# Patient Record
Sex: MALE | URBAN - NONMETROPOLITAN AREA
[De-identification: names, ages, dates, MRNs, and addresses within clinical notes are randomized per-mention and may not be internally consistent; named-entity substitution may affect disease eponyms.]

---

## 2023-06-30 ENCOUNTER — OFFICE VISIT (OUTPATIENT)
Dept: FAMILY MEDICINE CLINIC | Age: 18
End: 2023-06-30

## 2023-06-30 VITALS
WEIGHT: 124 LBS | DIASTOLIC BLOOD PRESSURE: 68 MMHG | RESPIRATION RATE: 16 BRPM | BODY MASS INDEX: 19.93 KG/M2 | HEART RATE: 74 BPM | HEIGHT: 66 IN | SYSTOLIC BLOOD PRESSURE: 120 MMHG | TEMPERATURE: 97.8 F | OXYGEN SATURATION: 100 %

## 2023-06-30 DIAGNOSIS — F25.1 SCHIZOAFFECTIVE DISORDER, DEPRESSIVE TYPE (HCC): Primary | ICD-10-CM

## 2023-06-30 DIAGNOSIS — F90.0 ATTENTION DEFICIT HYPERACTIVITY DISORDER (ADHD), PREDOMINANTLY INATTENTIVE TYPE: ICD-10-CM

## 2023-06-30 DIAGNOSIS — F43.10 PTSD (POST-TRAUMATIC STRESS DISORDER): ICD-10-CM

## 2023-06-30 DIAGNOSIS — F40.01 PANIC DISORDER WITH AGORAPHOBIA: ICD-10-CM

## 2023-06-30 PROCEDURE — 99204 OFFICE O/P NEW MOD 45 MIN: CPT | Performed by: NURSE PRACTITIONER

## 2023-06-30 RX ORDER — CLONIDINE HYDROCHLORIDE 0.1 MG/1
0.1 TABLET ORAL 2 TIMES DAILY
COMMUNITY

## 2023-06-30 RX ORDER — TRAZODONE HYDROCHLORIDE 50 MG/1
50 TABLET ORAL NIGHTLY
COMMUNITY
Start: 2023-06-02

## 2023-06-30 RX ORDER — BUPROPION HYDROCHLORIDE 150 MG/1
150 TABLET ORAL DAILY
COMMUNITY
Start: 2023-05-29 | End: 2023-06-30

## 2023-06-30 RX ORDER — BUSPIRONE HYDROCHLORIDE 7.5 MG/1
7.5 TABLET ORAL 3 TIMES DAILY
Qty: 90 TABLET | Refills: 0 | Status: SHIPPED | OUTPATIENT
Start: 2023-06-30 | End: 2023-07-30

## 2023-06-30 RX ORDER — HYDROXYZINE HYDROCHLORIDE 25 MG/1
25 TABLET, FILM COATED ORAL 2 TIMES DAILY PRN
COMMUNITY
Start: 2023-05-29

## 2023-06-30 RX ORDER — BUPROPION HYDROCHLORIDE 300 MG/1
300 TABLET ORAL DAILY
Qty: 30 TABLET | Refills: 1 | Status: SHIPPED | OUTPATIENT
Start: 2023-06-30

## 2023-06-30 RX ORDER — ATOMOXETINE 60 MG/1
60 CAPSULE ORAL DAILY
COMMUNITY
Start: 2023-05-29

## 2023-06-30 RX ORDER — PRAZOSIN HYDROCHLORIDE 1 MG/1
1 CAPSULE ORAL NIGHTLY
COMMUNITY
Start: 2023-06-02

## 2023-06-30 SDOH — ECONOMIC STABILITY: INCOME INSECURITY: HOW HARD IS IT FOR YOU TO PAY FOR THE VERY BASICS LIKE FOOD, HOUSING, MEDICAL CARE, AND HEATING?: NOT HARD AT ALL

## 2023-06-30 SDOH — ECONOMIC STABILITY: FOOD INSECURITY: WITHIN THE PAST 12 MONTHS, YOU WORRIED THAT YOUR FOOD WOULD RUN OUT BEFORE YOU GOT MONEY TO BUY MORE.: NEVER TRUE

## 2023-06-30 SDOH — ECONOMIC STABILITY: HOUSING INSECURITY
IN THE LAST 12 MONTHS, WAS THERE A TIME WHEN YOU DID NOT HAVE A STEADY PLACE TO SLEEP OR SLEPT IN A SHELTER (INCLUDING NOW)?: NO

## 2023-06-30 SDOH — ECONOMIC STABILITY: FOOD INSECURITY: WITHIN THE PAST 12 MONTHS, THE FOOD YOU BOUGHT JUST DIDN'T LAST AND YOU DIDN'T HAVE MONEY TO GET MORE.: NEVER TRUE

## 2023-06-30 ASSESSMENT — PATIENT HEALTH QUESTIONNAIRE - PHQ9
SUM OF ALL RESPONSES TO PHQ QUESTIONS 1-9: 2
SUM OF ALL RESPONSES TO PHQ9 QUESTIONS 1 & 2: 2
SUM OF ALL RESPONSES TO PHQ QUESTIONS 1-9: 2
2. FEELING DOWN, DEPRESSED OR HOPELESS: 1
1. LITTLE INTEREST OR PLEASURE IN DOING THINGS: 1
SUM OF ALL RESPONSES TO PHQ QUESTIONS 1-9: 2
SUM OF ALL RESPONSES TO PHQ QUESTIONS 1-9: 2

## 2023-09-15 RX ORDER — TRAZODONE HYDROCHLORIDE 50 MG/1
50 TABLET ORAL NIGHTLY
Qty: 30 TABLET | Refills: 4 | Status: SHIPPED | OUTPATIENT
Start: 2023-09-15

## 2023-09-15 RX ORDER — CLONIDINE HYDROCHLORIDE 0.1 MG/1
0.2 TABLET ORAL NIGHTLY
Qty: 60 TABLET | Refills: 4 | Status: SHIPPED | OUTPATIENT
Start: 2023-09-15

## 2023-09-15 RX ORDER — PRAZOSIN HYDROCHLORIDE 1 MG/1
1 CAPSULE ORAL NIGHTLY
Qty: 30 CAPSULE | Refills: 4 | Status: SHIPPED | OUTPATIENT
Start: 2023-09-15

## 2023-09-20 ENCOUNTER — TELEPHONE (OUTPATIENT)
Dept: FAMILY MEDICINE CLINIC | Age: 18
End: 2023-09-20

## 2023-09-20 DIAGNOSIS — S39.012A STRAIN OF LUMBAR REGION, INITIAL ENCOUNTER: Primary | ICD-10-CM

## 2023-09-20 RX ORDER — TIZANIDINE 4 MG/1
4 TABLET ORAL EVERY 8 HOURS PRN
Qty: 30 TABLET | Refills: 0 | Status: SHIPPED | OUTPATIENT
Start: 2023-09-20

## 2023-09-20 RX ORDER — IBUPROFEN 800 MG/1
800 TABLET ORAL 4 TIMES DAILY PRN
Qty: 120 TABLET | Refills: 0 | Status: SHIPPED | OUTPATIENT
Start: 2023-09-20

## 2023-09-20 NOTE — TELEPHONE ENCOUNTER
Patient injured his back carrying a very heavy object. Has been taking motrin 800 mg which does help some. Would like something else sent to pharmacy. Rx for 800 mg Motrin and Zanaflex 4 mg sent to [facility].

## 2023-10-05 ENCOUNTER — HOSPITAL ENCOUNTER (INPATIENT)
Age: 18
LOS: 4 days | Discharge: HOME | DRG: 885 | End: 2023-10-09
Attending: PSYCHIATRY & NEUROLOGY | Admitting: PSYCHIATRY & NEUROLOGY
Payer: MEDICAID

## 2023-10-05 PROBLEM — F33.3 MDD (MAJOR DEPRESSIVE DISORDER), RECURRENT, SEVERE, WITH PSYCHOSIS (HCC): Status: ACTIVE | Noted: 2023-10-05

## 2023-10-05 PROBLEM — F41.1 GAD (GENERALIZED ANXIETY DISORDER): Chronic | Status: ACTIVE | Noted: 2023-06-30

## 2023-10-05 LAB
T4 FREE SERPL-MCNC: 1.62 NG/DL (ref 0.93–1.76)
TSH SERPL DL<=0.005 MIU/L-ACNC: 0.81 UIU/ML (ref 0.4–4.2)

## 2023-10-05 PROCEDURE — 1240000000 HC EMOTIONAL WELLNESS R&B

## 2023-10-05 PROCEDURE — 36415 COLL VENOUS BLD VENIPUNCTURE: CPT

## 2023-10-05 PROCEDURE — 6370000000 HC RX 637 (ALT 250 FOR IP): Performed by: PSYCHIATRY & NEUROLOGY

## 2023-10-05 PROCEDURE — 84439 ASSAY OF FREE THYROXINE: CPT

## 2023-10-05 PROCEDURE — 84443 ASSAY THYROID STIM HORMONE: CPT

## 2023-10-05 RX ORDER — FLUOXETINE HYDROCHLORIDE 20 MG/1
20 CAPSULE ORAL DAILY
Status: DISCONTINUED | OUTPATIENT
Start: 2023-10-06 | End: 2023-10-08

## 2023-10-05 RX ORDER — IBUPROFEN 400 MG/1
400 TABLET ORAL EVERY 6 HOURS PRN
Status: DISCONTINUED | OUTPATIENT
Start: 2023-10-05 | End: 2023-10-08

## 2023-10-05 RX ORDER — POLYETHYLENE GLYCOL 3350 17 G/17G
17 POWDER, FOR SOLUTION ORAL DAILY PRN
Status: DISCONTINUED | OUTPATIENT
Start: 2023-10-05 | End: 2023-10-09 | Stop reason: HOSPADM

## 2023-10-05 RX ORDER — TRAZODONE HYDROCHLORIDE 50 MG/1
50 TABLET ORAL NIGHTLY PRN
Status: DISCONTINUED | OUTPATIENT
Start: 2023-10-05 | End: 2023-10-09 | Stop reason: HOSPADM

## 2023-10-05 RX ORDER — RISPERIDONE 0.25 MG/1
0.5 TABLET ORAL NIGHTLY
Status: DISCONTINUED | OUTPATIENT
Start: 2023-10-05 | End: 2023-10-09 | Stop reason: HOSPADM

## 2023-10-05 RX ORDER — CLONIDINE HYDROCHLORIDE 0.2 MG/1
0.2 TABLET ORAL NIGHTLY
Status: DISCONTINUED | OUTPATIENT
Start: 2023-10-05 | End: 2023-10-09 | Stop reason: HOSPADM

## 2023-10-05 RX ORDER — ACETAMINOPHEN 325 MG/1
650 TABLET ORAL EVERY 4 HOURS PRN
Status: DISCONTINUED | OUTPATIENT
Start: 2023-10-05 | End: 2023-10-08

## 2023-10-05 RX ORDER — PRAZOSIN HYDROCHLORIDE 1 MG/1
1 CAPSULE ORAL NIGHTLY
Status: DISCONTINUED | OUTPATIENT
Start: 2023-10-05 | End: 2023-10-09 | Stop reason: HOSPADM

## 2023-10-05 RX ORDER — HYDROXYZINE HYDROCHLORIDE 25 MG/1
50 TABLET, FILM COATED ORAL 3 TIMES DAILY PRN
Status: DISCONTINUED | OUTPATIENT
Start: 2023-10-05 | End: 2023-10-09 | Stop reason: HOSPADM

## 2023-10-05 RX ORDER — POLYETHYLENE GLYCOL 3350 17 G
2 POWDER IN PACKET (EA) ORAL
Status: DISCONTINUED | OUTPATIENT
Start: 2023-10-05 | End: 2023-10-09 | Stop reason: HOSPADM

## 2023-10-05 RX ADMIN — CLONIDINE HYDROCHLORIDE 0.2 MG: 0.2 TABLET ORAL at 22:28

## 2023-10-05 RX ADMIN — TRAZODONE HYDROCHLORIDE 50 MG: 50 TABLET ORAL at 22:03

## 2023-10-05 RX ADMIN — RISPERIDONE 0.5 MG: 0.25 TABLET, FILM COATED ORAL at 22:28

## 2023-10-05 RX ADMIN — NICOTINE POLACRILEX 2 MG: 2 LOZENGE ORAL at 18:37

## 2023-10-05 RX ADMIN — IBUPROFEN 400 MG: 400 TABLET, FILM COATED ORAL at 20:56

## 2023-10-05 RX ADMIN — PRAZOSIN HYDROCHLORIDE 1 MG: 1 CAPSULE ORAL at 22:28

## 2023-10-05 RX ADMIN — NICOTINE POLACRILEX 2 MG: 2 LOZENGE ORAL at 16:29

## 2023-10-05 RX ADMIN — NICOTINE POLACRILEX 2 MG: 2 LOZENGE ORAL at 20:56

## 2023-10-05 RX ADMIN — HYDROXYZINE HYDROCHLORIDE 50 MG: 25 TABLET, FILM COATED ORAL at 16:29

## 2023-10-05 ASSESSMENT — SLEEP AND FATIGUE QUESTIONNAIRES
DO YOU HAVE DIFFICULTY SLEEPING: YES
AVERAGE NUMBER OF SLEEP HOURS: 8
SLEEP PATTERN: DIFFICULTY FALLING ASLEEP;EARLY AWAKENING;RESTLESSNESS;NIGHTMARES/TERRORS
DO YOU USE A SLEEP AID: YES

## 2023-10-05 ASSESSMENT — PATIENT HEALTH QUESTIONNAIRE - PHQ9
SUM OF ALL RESPONSES TO PHQ QUESTIONS 1-9: 2
2. FEELING DOWN, DEPRESSED OR HOPELESS: 1
SUM OF ALL RESPONSES TO PHQ9 QUESTIONS 1 & 2: 2
1. LITTLE INTEREST OR PLEASURE IN DOING THINGS: 1
SUM OF ALL RESPONSES TO PHQ QUESTIONS 1-9: 2

## 2023-10-05 ASSESSMENT — PAIN DESCRIPTION - LOCATION: LOCATION: BACK

## 2023-10-05 ASSESSMENT — PAIN DESCRIPTION - ORIENTATION: ORIENTATION: MID;UPPER

## 2023-10-05 ASSESSMENT — PAIN - FUNCTIONAL ASSESSMENT: PAIN_FUNCTIONAL_ASSESSMENT: ACTIVITIES ARE NOT PREVENTED

## 2023-10-05 ASSESSMENT — PAIN SCALES - GENERAL: PAINLEVEL_OUTOF10: 3

## 2023-10-05 ASSESSMENT — LIFESTYLE VARIABLES
HOW OFTEN DO YOU HAVE A DRINK CONTAINING ALCOHOL: NEVER
HOW MANY STANDARD DRINKS CONTAINING ALCOHOL DO YOU HAVE ON A TYPICAL DAY: PATIENT DOES NOT DRINK

## 2023-10-05 ASSESSMENT — PAIN DESCRIPTION - DESCRIPTORS: DESCRIPTORS: ACHING

## 2023-10-05 NOTE — PROGRESS NOTES
Behavioral Health   Admission Note   Admission Type: Voluntary    Reason for Admission: Overdose    Patient Strengths/Barriers  Strengths (Must Choose Two): Independent living, Education, Support from family  Barriers: Independent living, Technical/vocation    Addictive Behavior  In the Past 3 Months, Have You Felt or Has Someone Told You That You Have a Problem With  : None    Medical Problems:   History reviewed. No pertinent past medical history. Status EXAM:  Mental Status and Behavioral Exam  Normal: No  Level of Assistance: Independent/Self  Facial Expression: Avoids Gaze, Flat  Affect: Blunt  Level of Consciousness: Alert  Frequency of Checks: 4 times per hour, close  Mood:Normal: No  Mood: Depressed, Anxious, Sad, Worthless, low self-esteem  Motor Activity:Normal: No  Motor Activity: Decreased  Eye Contact: Fair  Observed Behavior: Cooperative  Sexual Misconduct History: Current - no  Preception: [place] to person, [place] to place, [place] to time, [place] to situation  Attention:Normal: No  Attention: Distractible  Thought Processes: Circumstantial  Thought Content:Normal: Yes  Depression Symptoms: Feelings of hopelessess, Feelings of helplessness, Feelings of worthlessness, Impaired concentration, Loss of interest, Isolative, Change in energy level  Anxiety Symptoms: Generalized  Irma Symptoms: No problems reported or observed. Hallucinations: Auditory (comment), Visual (comment) (\"Degrading or non-sensical voices\"  sees creatures.)  Delusions: Yes  Delusions: Paranoid  Memory:Normal: No  Memory: Poor recent  Insight and Judgment: No  Insight and Judgment: Poor judgment, Poor insight, Unmotivated    Pt admitted with followings belongings:  Dental Appliances: None  Vision - Corrective Lenses: Contact Lenses, Eyeglasses (Patient reports he has contacts in.)  Hearing Aid: None  Jewelry: None  Body Piercings Removed: No  Clothing: Socks, Undergarments, Shirt, Pants  Other Valuables:  Other (Comment) (Locked

## 2023-10-05 NOTE — BH NOTE
INPATIENT RECREATIONAL THERAPY  ADULT BEHAVIORAL SERVICES  ASSESSMENT    REFERRING PHYSICIAN: Rudy Jones  DIAGNOSIS:   MDD  PRECAUTIONS:  Full Code  HISTORY OF PRESENT ILLNESS/INJURY:   PMH:  Please see medical chart for prior medical history, allergies, and medication. HISTORY OF PSYCHIATRIC TREATMENT: PTSD, Panic Disorder, ADHD, Anxiety and Depression. 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 IDENTIFIED SUPPORT SYSTEM:  Dad, Mom, Sister, Brother. Friend in [place] who talks with Otto Miller everyday. EDUCATIONAL LEVEL: G.E.D.  MEDICATION/DRUG USE: \"I use to do a lot of stuff in highschool, I smoke weed in [place], I will drink whatever alcohol I can, I also will do pills\"      COGNITION: Full  ATTENTION: Throughout school pt had difficult time paying attention.   RELAXATION: Able to Relax  SELF-ESTEEM:  \"It's botched, it's never been good\"  MOTIVATION: Low    SOCIAL SKILLS:  Proficient   FRUSTRATION TOLERANCE:  High  ATTENTION SEEKING: N/A  COOPERATION: Full  AFFECT: Restricted  APPEARANCE: Healthy    HEARING:  WNL  VISION:  WNL   VERBAL COMMUNICATION:  Normal  WRITTEN COMMUNICATION:  Normal    COORDINATION: Normal   MOBILITY: Normal    GOALS:  \"I want to feel a little bit more ready to try motivation and ambition\"

## 2023-10-05 NOTE — PROGRESS NOTES
OQ Admission    Most Recent Score:    82    Baseline Score:   82    Change From Initial: No Reliable Change  Distress Level: Severe       Graph Type: Total  Most Recent Critical Item Status:  7. Suicide - I have thoughts of ending my life. Frequently  11. Substance Abuse - I use alcohol or a drug to get going in the morning. Sometimes  20. Substance Abuse - People criticize my drinking (or drug use). Sometimes  24. Substance Abuse - I have trouble at work/school or other daily activities because of drinking or drug use.  Rarely

## 2023-10-06 PROCEDURE — 6370000000 HC RX 637 (ALT 250 FOR IP): Performed by: PSYCHIATRY & NEUROLOGY

## 2023-10-06 PROCEDURE — 1240000000 HC EMOTIONAL WELLNESS R&B

## 2023-10-06 RX ADMIN — CLONIDINE HYDROCHLORIDE 0.2 MG: 0.2 TABLET ORAL at 22:02

## 2023-10-06 RX ADMIN — HYDROXYZINE HYDROCHLORIDE 50 MG: 25 TABLET, FILM COATED ORAL at 16:34

## 2023-10-06 RX ADMIN — NICOTINE POLACRILEX 2 MG: 2 LOZENGE ORAL at 10:10

## 2023-10-06 RX ADMIN — PRAZOSIN HYDROCHLORIDE 1 MG: 1 CAPSULE ORAL at 22:02

## 2023-10-06 RX ADMIN — NICOTINE POLACRILEX 2 MG: 2 LOZENGE ORAL at 16:34

## 2023-10-06 RX ADMIN — NICOTINE POLACRILEX 2 MG: 2 LOZENGE ORAL at 22:02

## 2023-10-06 RX ADMIN — RISPERIDONE 0.5 MG: 0.25 TABLET, FILM COATED ORAL at 22:02

## 2023-10-06 RX ADMIN — FLUOXETINE 20 MG: 20 CAPSULE ORAL at 09:23

## 2023-10-06 RX ADMIN — NICOTINE POLACRILEX 2 MG: 2 LOZENGE ORAL at 13:41

## 2023-10-06 ASSESSMENT — PAIN SCALES - GENERAL
PAINLEVEL_OUTOF10: 0
PAINLEVEL_OUTOF10: 0

## 2023-10-06 NOTE — H&P
psychotic  features. 2.  Generalized anxiety disorder. 3.  Post-traumatic stress disorder. 4.  Rule out schizoaffective disorder, depressed type. 5.  Recent overdose. 6.  Chronic mental illness, recent breakup from girlfriend. RECOMMENDATIONS:  1. Admit to the unit. 2.  Routine lab ordered. 3.  Resume home medication, but change trazodone to as needed. Start  fluoxetine, risperidone and hydroxyzine. 4.  Risks and benefits of psychotropics discussed as well as alternative  treatment. 5.  Support and reassurance given. 6.  Milieu and group therapy to develop insight to psychiatric illness  and better coping mechanism. 7.  Upon discharge, he will be referred for outpatient management. PATIENT'S STRENGTH:  His mother.         Isaias Zayas M.D.    D: 10/05/2023 21:31:22       T: 10/05/2023 21:37:05     [id number]  Job#: [id number]     Doc#: [id number]    CC:

## 2023-10-06 NOTE — PATIENT CARE CONFERENCE
[facility]  Initial Interdisciplinary Treatment Plan NOTE    REVIEW DATE AND TIME: 10/6/23 0847    PATIENT was IN TREATMENT TEAM.  See Multidisciplinary Treatment Team sheet for participants. ADMISSION TYPE:   Admission Type: Voluntary    REASON FOR ADMISSION:  Reason for Admission: Overdose      Estimated Length of Stay Update:  3-5 days  Estimated Discharge Date Update: 10/7/23    Patient Strengths/Barriers  Strengths (Must Choose Two): Independent living, Education, Support from family  Barriers: Independent living, Technical/vocation  Addictive Behavior:Addictive Behavior  In the Past 3 Months, Have You Felt or Has Someone Told You That You Have a Problem With  : None  Medical Problems:History reviewed. No pertinent past medical history. EDUCATION:   Learner Progress Toward Treatment Goals: Reviewed results and recommendations of this team, Reviewed group plan and strategies, Reviewed signs, symptoms and risk of self harm and violent behavior, and Reviewed goals and plan of care    Method: Small group    Outcome: Refused Education    PATIENT GOALS: Stabilize pt psychiatric symptoms     OQ TOP QUALITY PRIORITIES FOR THE PATIENT AS IDENTIFIED ON ADMISSION ADMINISTRATION:        80 - Suicidal Ideation/Substance Abuse  PLAN/TREATMENT RECOMMENDATIONS UPDATE:   What is the most important thing we can help you with while you are here? See above  Who is your support system? ADY  Do you have follow-up providers? ADY  Do you have the ability to pay for your medications? Per Epic, pt does not currently have insurance  Where will you be residing when you leave the hospital? Per [facility], with mother and sister. Will need a return to work slip or FMLA paper completion?  ADY      GOALS UPDATE:   Time frame for Short-Term Goals: Daily    JOSE ARMANDO Hicks

## 2023-10-06 NOTE — PROGRESS NOTES
Group Therapy Note    Date: 10/6/2023  Start Time: 1330  End Time:  1440  Number of Participants: 6    Type of Group: Psychotherapy      Notes:  Pt is present for group. The group continued with the discussion from [facility] group. The group continued to examine personal beliefs, the origin of those beliefs and the way in which they contribute to emotional distress. The group identified and discussed  faulty beliefs and how they result in self sabotaging behaviors. The group was introduced to CBT concepts and how they may challenge and modify those thoughts. The group also participated in a simple stress management activity. Status After Intervention:  Improved    Participation Level:  Active Listener and Interactive    Participation Quality: Appropriate, Attentive, Sharing, and Supportive      Speech:  normal      Thought Process/Content: Logical  Linear      Affective Functioning: Congruent      Mood: euthymic      Level of consciousness:  Alert, Oriented x4, and Attentive      Response to Learning: Able to verbalize current knowledge/experience, Able to verbalize/acknowledge new learning, Able to retain information, Capable of insight, Able to change behavior, and Progressing to goal      Endings: None Reported    Modes of Intervention: Education, Support, Socialization, Exploration, Clarifying, Problem-solving, and Activity      Discipline Responsible: /Counselor      Signature:  JOSE ARMANDO Antoine

## 2023-10-06 NOTE — BH NOTE
RECREATIONAL THERAPY GROUP NOTE    Start Time:  1000  End Time:  1030    Group Type:  RT group    Group Topic: Hands On         Intervention Method:  Social Game    Attendance:  Attended    Affect/Mood: Bright    Thought Process:  Clear    Behavior:  Pt was lethargic from low BP    Interactions/Socialization:  Interacted readily    Response to Intervention:  Positve

## 2023-10-06 NOTE — PROGRESS NOTES
Psychosocial Assessment    Current Level of Psychosocial Functioning     Independent         Dependent    Minimal Assist     Comments:  ADY as pt was sleeping at time of assessment and would not wake    Psychosocial High Risk Factors (check all that apply)    Unable to obtain meds   Chronic illness/pain    Substance abuse   Lack of Family Support   Financial stress   Isolation   Inadequate Community Resources  Suicide attempt(s)  Not taking medications   Victim of crime   Developmental Delay  Unable to manage personal needs    Age 72 or older   Homeless  No transportation   Readmission within 30 days  Unemployment  Traumatic Event  ADY    Family/Supports identified: ADY    Sexual Orientation:  ADY    Patient Strengths: ADY    Patient Barriers: ADY    Safety plan: Contracts for safety    CMHC/ history: ADY    Plan of Care:  medication management, group/individual therapies, family meetings, psycho -education, treatment team meetings to assist with stabilization    Initial Discharge Plan:  ADY    Clinical Summary:  Patient is a 19year old male that was admitted to the unit as a transfer from [facility]. Per H&P \"He was taken to [facility] after taking about 15 tablets of tizanidine in an attempt to kill himself. He has a long history of depression, anxiety and hallucinations. His depressive symptoms have been getting worse past 4 to 5 months. \". Per RN Admission Note \"Patient reports being \"over it. \" Patient states he moved here in June against his will. Patient  reports he broke up with his girlfriend prior to coming to [place] and has not gotten over it. Patient reports having \"survived a cult. \" Patient states name of the cult is [employer]. \" Patient states that the leader of the cult believes himself to be a prophet. Patient states his mom still follows the beliefs of the cult. \". ADY farther due to pt sleeping and unable to wake.

## 2023-10-07 PROCEDURE — 6370000000 HC RX 637 (ALT 250 FOR IP): Performed by: PSYCHIATRY & NEUROLOGY

## 2023-10-07 PROCEDURE — 1240000000 HC EMOTIONAL WELLNESS R&B

## 2023-10-07 RX ADMIN — RISPERIDONE 0.5 MG: 0.25 TABLET, FILM COATED ORAL at 22:05

## 2023-10-07 RX ADMIN — HYDROXYZINE HYDROCHLORIDE 50 MG: 25 TABLET, FILM COATED ORAL at 20:34

## 2023-10-07 RX ADMIN — PRAZOSIN HYDROCHLORIDE 1 MG: 1 CAPSULE ORAL at 22:05

## 2023-10-07 RX ADMIN — NICOTINE POLACRILEX 2 MG: 2 LOZENGE ORAL at 09:46

## 2023-10-07 RX ADMIN — NICOTINE POLACRILEX 2 MG: 2 LOZENGE ORAL at 16:52

## 2023-10-07 RX ADMIN — NICOTINE POLACRILEX 2 MG: 2 LOZENGE ORAL at 20:24

## 2023-10-07 RX ADMIN — FLUOXETINE 20 MG: 20 CAPSULE ORAL at 09:27

## 2023-10-07 RX ADMIN — NICOTINE POLACRILEX 2 MG: 2 LOZENGE ORAL at 22:05

## 2023-10-07 RX ADMIN — NICOTINE POLACRILEX 2 MG: 2 LOZENGE ORAL at 13:52

## 2023-10-07 RX ADMIN — IBUPROFEN 400 MG: 400 TABLET, FILM COATED ORAL at 18:39

## 2023-10-07 RX ADMIN — TRAZODONE HYDROCHLORIDE 50 MG: 50 TABLET ORAL at 22:05

## 2023-10-07 RX ADMIN — ACETAMINOPHEN 650 MG: 325 TABLET ORAL at 20:34

## 2023-10-07 RX ADMIN — CLONIDINE HYDROCHLORIDE 0.2 MG: 0.2 TABLET ORAL at 22:05

## 2023-10-07 ASSESSMENT — PAIN - FUNCTIONAL ASSESSMENT
PAIN_FUNCTIONAL_ASSESSMENT: PREVENTS OR INTERFERES SOME ACTIVE ACTIVITIES AND ADLS
PAIN_FUNCTIONAL_ASSESSMENT: ACTIVITIES ARE NOT PREVENTED
PAIN_FUNCTIONAL_ASSESSMENT: ACTIVITIES ARE NOT PREVENTED

## 2023-10-07 ASSESSMENT — PAIN DESCRIPTION - ORIENTATION
ORIENTATION: RIGHT

## 2023-10-07 ASSESSMENT — PAIN SCALES - GENERAL
PAINLEVEL_OUTOF10: 7
PAINLEVEL_OUTOF10: 4
PAINLEVEL_OUTOF10: 6
PAINLEVEL_OUTOF10: 3
PAINLEVEL_OUTOF10: 0

## 2023-10-07 ASSESSMENT — PAIN DESCRIPTION - ONSET
ONSET: ON-GOING
ONSET: ON-GOING
ONSET: PROGRESSIVE

## 2023-10-07 ASSESSMENT — PAIN DESCRIPTION - FREQUENCY
FREQUENCY: INTERMITTENT

## 2023-10-07 ASSESSMENT — PAIN DESCRIPTION - LOCATION
LOCATION: PENIS
LOCATION: PENIS;OTHER (COMMENT)
LOCATION: PENIS

## 2023-10-07 ASSESSMENT — PAIN DESCRIPTION - DESCRIPTORS
DESCRIPTORS: DISCOMFORT;ACHING
DESCRIPTORS: ACHING
DESCRIPTORS: ACHING

## 2023-10-07 ASSESSMENT — PAIN DESCRIPTION - PAIN TYPE
TYPE: ACUTE PAIN

## 2023-10-07 NOTE — BH NOTE
Patient reports a small lump at the base of his penis on right side. This writer examined patient, small pea size lump noted on the right side at the base of the penis, no redness noted. Patient denies pain at present time, reports uncomfortable feeling. Dr. Edwin Orourke notified, will have patient follow up out patient.

## 2023-10-07 NOTE — PROGRESS NOTES
Group Therapy Note    Date: 10/7/2023  Start Time: 1330  End Time:  0225  Number of Participants: 5    Type of Group: Psychotherapy      Notes:  Pt is present for group with active participation. The group members shared their personal experiences with mental illness and or addiction disorders. Group members explored and identified both as being disease processes rather than moral deficits. The group focussed on actions which they can take which would support recovery rather than relapse. Shared that his parents have become more understanding and accepting of his mental illness over the last 5 years. Status After Intervention:  Improved    Participation Level:  Active Listener and Interactive    Participation Quality: Appropriate, Attentive, Sharing, and Supportive      Speech:  normal      Thought Process/Content: Logical  Linear      Affective Functioning: Congruent      Mood: euthymic      Level of consciousness:  Alert, Oriented x4, and Attentive      Response to Learning: Able to verbalize current knowledge/experience, Able to verbalize/acknowledge new learning, Able to retain information, Capable of insight, Able to change behavior, and Progressing to goal      Endings: None Reported    Modes of Intervention: Education, Support, Socialization, Exploration, Clarifying, and Problem-solving      Discipline Responsible: /Counselor      Signature:  JOSE ARMANDO Menendez

## 2023-10-07 NOTE — BH NOTE
Patient reports increased pain, not able to find a comfortable position to sit. Dr. Pili Potter notified.

## 2023-10-07 NOTE — PROGRESS NOTES
Start Time  1930  End Time  2000    Group Topic: Goal and Relaxation group      Group Type: Intervention Method:     Activity                                 Insight Oriented,Behavior Modifying, Supportive Psychotherapy           Attendance  Attended    AFFECT/MOOD:  Flat/blunted    THOUGHT PROCESS:  Goal-Directed    BEHAVIOR  Cooperative      Patient goal: \"Get back to [place]\"    Pt was friendly and cooperative during evening group.       Amy Loaiza, RN

## 2023-10-07 NOTE — PROGRESS NOTES
[facility]  Day 3 Interdisciplinary Treatment Plan NOTE    Review Date & Time: 10/07/23  1101    Patient was in treatment team    Admission Type:   Admission Type: Voluntary    Reason for admission:  Reason for Admission: Overdose  Estimated Length of Stay Update:  10/11/23  Estimated Discharge Date Update: 1-3 days    Patient Strengths/Barriers  Strengths (Must Choose Two): Independent living, Education, Support from family  Barriers: Independent living, Technical/vocation  Addictive Behavior:Addictive Behavior  In the Past 3 Months, Have You Felt or Has Someone Told You That You Have a Problem With  : None  Medical Problems:History reviewed. No pertinent past medical history. Risk:  Fall Risk   Antonio Scale Antonio Scale Score: 22    Status EXAM:   Mental Status and Behavioral Exam  Normal: No  Level of Assistance: Independent/Self  Facial Expression: Flat  Affect: Appropriate  Level of Consciousness: Alert  Frequency of Checks: 4 times per hour, close  Mood:Normal: No  Mood: Depressed, Anxious  Motor Activity:Normal: Yes  Motor Activity: Other (comment) (NA)  Eye Contact: Good  Observed Behavior: Cooperative, Friendly  Sexual Misconduct History: Current - no  Preception: [place] to person, [place] to time, [place] to place, [place] to situation  Attention:Normal: Yes  Attention: Others (comment) (NA)  Thought Processes: Circumstantial  Thought Content:Normal: Yes  Depression Symptoms: Impaired concentration, Isolative, Loss of interest, Change in energy level  Anxiety Symptoms: Generalized  Irma Symptoms: No problems reported or observed.   Hallucinations: None  Delusions: Yes  Delusions: Paranoid  Memory:Normal: No  Memory: Poor recent  Insight and Judgment: No  Insight and Judgment: Poor judgment, Poor insight    Daily Assessment Last Entry:   Daily Sleep (WDL): Within Defined Limits            Daily Nutrition (WDL): Within Defined Limits  Level of Assistance: Independent/Self    Patient

## 2023-10-07 NOTE — FLOWSHEET NOTE
10/07/23 1746   Treatment Team Notification   Reason for Communication Review case;Patient/Family request   Name of Team Member Notified Dr. Tyson Haszunilda Team Role Attending Provider   Method of Communication Secure Message   Response Waiting for response   Notification Time      Dr. Rudy Jones notified per patient request regarding increase pain and uncomfortable due to lump noted to base of right side of penis. [address] [place] [address] Dr Dr. Rudy Jones called in, consult for hospitalist will be ordered.

## 2023-10-08 PROCEDURE — 6370000000 HC RX 637 (ALT 250 FOR IP): Performed by: PSYCHIATRY & NEUROLOGY

## 2023-10-08 PROCEDURE — 1240000000 HC EMOTIONAL WELLNESS R&B

## 2023-10-08 RX ORDER — FLUOXETINE 10 MG/1
10 CAPSULE ORAL ONCE
Status: COMPLETED | OUTPATIENT
Start: 2023-10-08 | End: 2023-10-08

## 2023-10-08 RX ORDER — ACETAMINOPHEN 325 MG/1
650 TABLET ORAL EVERY 4 HOURS PRN
Status: DISCONTINUED | OUTPATIENT
Start: 2023-10-08 | End: 2023-10-09 | Stop reason: HOSPADM

## 2023-10-08 RX ORDER — IBUPROFEN 400 MG/1
400 TABLET ORAL EVERY 6 HOURS PRN
Status: DISCONTINUED | OUTPATIENT
Start: 2023-10-08 | End: 2023-10-09 | Stop reason: HOSPADM

## 2023-10-08 RX ADMIN — ACETAMINOPHEN 650 MG: 325 TABLET ORAL at 02:58

## 2023-10-08 RX ADMIN — NICOTINE POLACRILEX 2 MG: 2 LOZENGE ORAL at 22:12

## 2023-10-08 RX ADMIN — NICOTINE POLACRILEX 2 MG: 2 LOZENGE ORAL at 08:04

## 2023-10-08 RX ADMIN — CLONIDINE HYDROCHLORIDE 0.2 MG: 0.2 TABLET ORAL at 22:06

## 2023-10-08 RX ADMIN — HYDROXYZINE HYDROCHLORIDE 50 MG: 25 TABLET, FILM COATED ORAL at 22:06

## 2023-10-08 RX ADMIN — IBUPROFEN 400 MG: 400 TABLET, FILM COATED ORAL at 08:04

## 2023-10-08 RX ADMIN — ACETAMINOPHEN 650 MG: 325 TABLET ORAL at 22:07

## 2023-10-08 RX ADMIN — HYDROXYZINE HYDROCHLORIDE 50 MG: 25 TABLET, FILM COATED ORAL at 08:04

## 2023-10-08 RX ADMIN — NICOTINE POLACRILEX 2 MG: 2 LOZENGE ORAL at 13:40

## 2023-10-08 RX ADMIN — TRAZODONE HYDROCHLORIDE 50 MG: 50 TABLET ORAL at 22:06

## 2023-10-08 RX ADMIN — IBUPROFEN 400 MG: 400 TABLET, FILM COATED ORAL at 00:40

## 2023-10-08 RX ADMIN — IBUPROFEN 400 MG: 400 TABLET, FILM COATED ORAL at 17:49

## 2023-10-08 RX ADMIN — ACETAMINOPHEN 650 MG: 325 TABLET ORAL at 13:33

## 2023-10-08 RX ADMIN — NICOTINE POLACRILEX 2 MG: 2 LOZENGE ORAL at 17:49

## 2023-10-08 RX ADMIN — FLUOXETINE 20 MG: 20 CAPSULE ORAL at 08:04

## 2023-10-08 RX ADMIN — PRAZOSIN HYDROCHLORIDE 1 MG: 1 CAPSULE ORAL at 22:06

## 2023-10-08 RX ADMIN — RISPERIDONE 0.5 MG: 0.25 TABLET, FILM COATED ORAL at 22:06

## 2023-10-08 RX ADMIN — FLUOXETINE 10 MG: 10 CAPSULE ORAL at 14:37

## 2023-10-08 ASSESSMENT — PAIN DESCRIPTION - ONSET
ONSET: ON-GOING

## 2023-10-08 ASSESSMENT — PAIN DESCRIPTION - PAIN TYPE
TYPE: ACUTE PAIN

## 2023-10-08 ASSESSMENT — PAIN DESCRIPTION - LOCATION
LOCATION: PENIS
LOCATION: PENIS;OTHER (COMMENT)
LOCATION: PENIS

## 2023-10-08 ASSESSMENT — PAIN - FUNCTIONAL ASSESSMENT

## 2023-10-08 ASSESSMENT — PAIN SCALES - GENERAL
PAINLEVEL_OUTOF10: 4
PAINLEVEL_OUTOF10: 0
PAINLEVEL_OUTOF10: 1
PAINLEVEL_OUTOF10: 0
PAINLEVEL_OUTOF10: 7
PAINLEVEL_OUTOF10: 8
PAINLEVEL_OUTOF10: 3
PAINLEVEL_OUTOF10: 5
PAINLEVEL_OUTOF10: 1
PAINLEVEL_OUTOF10: 7
PAINLEVEL_OUTOF10: 7
PAINLEVEL_OUTOF10: 5

## 2023-10-08 ASSESSMENT — PAIN DESCRIPTION - FREQUENCY
FREQUENCY: CONTINUOUS
FREQUENCY: INTERMITTENT
FREQUENCY: CONTINUOUS
FREQUENCY: CONTINUOUS

## 2023-10-08 ASSESSMENT — PAIN DESCRIPTION - DESCRIPTORS
DESCRIPTORS: ACHING
DESCRIPTORS: ACHING;DISCOMFORT
DESCRIPTORS: DISCOMFORT;ACHING
DESCRIPTORS: ACHING
DESCRIPTORS: ACHING;DISCOMFORT
DESCRIPTORS: ACHING

## 2023-10-08 ASSESSMENT — PAIN DESCRIPTION - ORIENTATION
ORIENTATION: RIGHT
ORIENTATION: RIGHT
ORIENTATION: LEFT
ORIENTATION: RIGHT

## 2023-10-08 NOTE — PROGRESS NOTES
Dr John Sanford called states he had a consult regarding patients lump on the right side of his penis. Dr John Sanford recommends a urology consult and the hospitalist will not be seeing patient.

## 2023-10-08 NOTE — PROGRESS NOTES
Group Therapy Note    Date: 10/7/2023  Start Time: 2000  End Time:  2020  Number of Participants:     Type of Group: Wrap-Up    Wellness Binder Information  Module Name:    Session Number:      Patient's Goal:  to make it to groups     Notes:  goal met     Status After Intervention:  Unchanged    Participation Level:  Active Listener and Interactive    Participation Quality: Appropriate      Speech:  normal      Thought Process/Content: Logical      Affective Functioning: Flat      Mood: anxious      Level of consciousness:  Alert      Response to Learning: Able to verbalize current knowledge/experience, Able to verbalize/acknowledge new learning, and Able to retain information      Endings: None Reported    Modes of Intervention: Support and Socialization      Discipline Responsible: Registered Nurse      Signature:  Amanda Johnson RN

## 2023-10-08 NOTE — PROGRESS NOTES
Group Therapy Note    Date: 10/8/2023  Start Time: 1330  End Time:  1430  Number of Participants: 7    Type of Group: Psychotherapy      Notes:  Pt is present for group. The group discussed acceptance, gratitude and hope. Pt is supportive of his peers and he does demonstrates good personal insight. Status After Intervention:  Improved    Participation Level:  Attentive, appropriate    Participation Quality: Sharing, Supportive      Speech:  Normal      Thought Process/Content: Logical      Affective Functioning: Congruent      Mood: Dysphoric      Level of consciousness:  Alert and Attentive, oriented x4       Response to Learning: Able to verbalize current knowledge/experience, Able to change behavior, demonstrates insight and Progressing to goal      Endings: None Reported    Modes of Intervention: Support, Socialization, and Exploration      Discipline Responsible: /Counselor      Signature:  JOSE ARMANDO Gimenez

## 2023-10-09 ENCOUNTER — APPOINTMENT (OUTPATIENT)
Dept: ULTRASOUND IMAGING | Age: 18
DRG: 885 | End: 2023-10-09
Attending: PSYCHIATRY & NEUROLOGY
Payer: MEDICAID

## 2023-10-09 VITALS
OXYGEN SATURATION: 99 % | HEIGHT: 67 IN | WEIGHT: 120 LBS | BODY MASS INDEX: 18.83 KG/M2 | HEART RATE: 88 BPM | RESPIRATION RATE: 16 BRPM | DIASTOLIC BLOOD PRESSURE: 72 MMHG | SYSTOLIC BLOOD PRESSURE: 124 MMHG | TEMPERATURE: 97.9 F

## 2023-10-09 LAB
BILIRUB UR QL STRIP.AUTO: NEGATIVE
CHARACTER UR: CLEAR
COLOR: YELLOW
GLUCOSE UR QL STRIP.AUTO: NEGATIVE MG/DL
HGB UR QL STRIP.AUTO: NEGATIVE
KETONES UR QL STRIP.AUTO: NEGATIVE
NITRITE UR QL STRIP: NEGATIVE
PH UR STRIP.AUTO: 6 [PH] (ref 5–9)
PROT UR STRIP.AUTO-MCNC: NEGATIVE MG/DL
SP GR UR REFRACT.AUTO: > 1.03 (ref 1–1.03)
UROBILINOGEN, URINE: 1 EU/DL (ref 0–1)
WBC #/AREA URNS HPF: NEGATIVE /[HPF]

## 2023-10-09 PROCEDURE — 99252 IP/OBS CONSLTJ NEW/EST SF 35: CPT | Performed by: UROLOGY

## 2023-10-09 PROCEDURE — 81003 URINALYSIS AUTO W/O SCOPE: CPT

## 2023-10-09 PROCEDURE — 6370000000 HC RX 637 (ALT 250 FOR IP): Performed by: PSYCHIATRY & NEUROLOGY

## 2023-10-09 PROCEDURE — 76870 US EXAM SCROTUM: CPT

## 2023-10-09 PROCEDURE — 5130000000 HC BRIDGE APPOINTMENT

## 2023-10-09 RX ORDER — FLUOXETINE 10 MG/1
30 CAPSULE ORAL DAILY
Qty: 90 CAPSULE | Refills: 0 | Status: SHIPPED | OUTPATIENT
Start: 2023-10-10

## 2023-10-09 RX ORDER — HYDROXYZINE 50 MG/1
50 TABLET, FILM COATED ORAL 3 TIMES DAILY PRN
Qty: 90 TABLET | Refills: 0 | Status: SHIPPED | OUTPATIENT
Start: 2023-10-09 | End: 2023-11-08

## 2023-10-09 RX ORDER — TRAZODONE HYDROCHLORIDE 50 MG/1
50 TABLET ORAL NIGHTLY PRN
Qty: 30 TABLET | Refills: 0 | Status: SHIPPED | OUTPATIENT
Start: 2023-10-09

## 2023-10-09 RX ORDER — RISPERIDONE 0.5 MG/1
0.5 TABLET ORAL NIGHTLY
Qty: 30 TABLET | Refills: 0 | Status: SHIPPED | OUTPATIENT
Start: 2023-10-09

## 2023-10-09 RX ORDER — PRAZOSIN HYDROCHLORIDE 1 MG/1
1 CAPSULE ORAL NIGHTLY
Qty: 30 CAPSULE | Refills: 0 | Status: SHIPPED | OUTPATIENT
Start: 2023-10-09

## 2023-10-09 RX ADMIN — FLUOXETINE 30 MG: 20 CAPSULE ORAL at 07:37

## 2023-10-09 RX ADMIN — NICOTINE POLACRILEX 2 MG: 2 LOZENGE ORAL at 10:02

## 2023-10-09 RX ADMIN — IBUPROFEN 400 MG: 400 TABLET, FILM COATED ORAL at 08:03

## 2023-10-09 ASSESSMENT — PAIN DESCRIPTION - LOCATION: LOCATION: GROIN

## 2023-10-09 ASSESSMENT — PAIN SCALES - GENERAL
PAINLEVEL_OUTOF10: 0
PAINLEVEL_OUTOF10: 4
PAINLEVEL_OUTOF10: 4

## 2023-10-09 ASSESSMENT — PAIN DESCRIPTION - DESCRIPTORS: DESCRIPTORS: ACHING

## 2023-10-09 NOTE — PROGRESS NOTES
Behavioral Health   Discharge Note    Pt discharged with followings belongings:   Dental Appliances: None  Vision - Corrective Lenses: Contact Lenses, Eyeglasses (Patient reports he has contacts in.)  Hearing Aid: None  Jewelry: None  Body Piercings Removed: No  Clothing: Socks, Undergarments, Shirt, Pants  Other Valuables: Other (Comment) (Locked in 4E storage)   Valuables retrieved from safe, security envelope number:  n/a and returned to patient. Patient left department with staff . Discharged to home. \"An Important Message from Medicare About Your Rights\" (IMM) form photocopy original from admission and provided to pt at least 4 hours prior to discharge N/A. If pt left within 4 hours of receiving 2nd delivery of IMM, this is because pt was agreeable with hospital discharge. Patient/guardian education on aftercare instructions: Yes  Bridge appointment completed:  yes. Reviewed Discharge Instructions with patient/family/nursing facility. Patient/family verbalizes understanding and agreement with the discharge plan using the teachback method. Patient/family verbalize understanding of AVS:Yes    Status EXAM upon discharge:  Mental Status and Behavioral Exam  Normal: No  Level of Assistance: Independent/Self  Facial Expression: Flat  Affect: Appropriate  Level of Consciousness: Alert  Frequency of Checks: 4 times per hour, close  Mood:Normal: Yes  Mood: Depressed, Anxious  Motor Activity:Normal: Yes  Motor Activity: Other (comment) (NA)  Eye Contact: Good  Observed Behavior: Cooperative  Sexual Misconduct History: Current - no  Preception: [place] to person, [place] to time, [place] to place, [place] to situation  Attention:Normal: Yes  Attention: Others (comment) (NA)  Thought Processes: Unremarkable  Thought Content:Normal: Yes  Depression Symptoms: No problems reported or observed. Anxiety Symptoms: No problems reported or observed. Irma Symptoms: No problems reported or observed.   Hallucinations:

## 2023-10-09 NOTE — DISCHARGE INSTRUCTIONS
Keep all follow-up appointments and take medications as directed. Call the hope line if needed at :  Jessica Meraz, and .S. [facility] [phone]. Jazzmine Levi [phone]. [facility] [phone]. [address] [place] [address]. [employer] [facility] [phone]. Mckayla Barton and [facility] [phone]    Symptoms to report to your Doctor:  Depression  Inability to eat, sleep, or have a bowel movement  Increased sleepiness and lethargy  Voices in your head  Any thoughts of harming self or others    Things to avoid:  Caffeine  Alcohol  No street drugs  Over the counter medications unless Ok'd by your physician or pharmacist.  Driving or operating machinery until full effects of your medications are known. Driving or operating machinery if dizzy or drowsy from medications. Use journal as directed. Education:  Illness and medication teaching was completed. Discharge Disposition: Patient was discharged to home and was transported by private vehicle. Patient was accompanied by staff.       Information sent to next level of care:    ____Admission orders to [facility] [address] [facility] [address]    __X__Discharge instructions    ____Behavioral Services Assessment    ____Hand off Summary    ____History and Physical    ____Last dose MAR    ____Patient transfer form    ____Other

## 2023-10-09 NOTE — GROUP NOTE
Group Therapy Note    Date: 10/9/2023    Group Start Time: 1100  Group End Time: [phone]  Group Topic: Healthy Living/Wellness    [facility] 4E    Vanda Ramirez LPN        Group Therapy Note    Attendees: 6      Notes:  attended    Status After Intervention:  Improved    Participation Level:  Active Listener    Participation Quality: Appropriate and Attentive      Speech:  normal      Thought Process/Content: Logical      Affective Functioning: Flat      Level of consciousness:  Alert, Oriented x4, and Attentive      Response to Learning: Able to verbalize current knowledge/experience, Able to verbalize/acknowledge new learning, Able to retain information, and Capable of insight      Endings: None Reported    Modes of Intervention: Education and Support      Discipline Responsible: Licensed Practical Nurse, nursing students      Signature:  Vanda Ramirez LPN

## 2023-10-09 NOTE — PROGRESS NOTES
Start Time  1930  End Time  2000    Group Topic: Goal and Relaxation group      Group Type: Intervention Method:     Activity                                 Insight Oriented,Behavior Modifying, Supportive Psychotherapy           Attendance  Attended    AFFECT/MOOD:  Flat/blunted    THOUGHT PROCESS:  Goal-Directed    BEHAVIOR  Cooperative      Patients goal: \"Fix my car\"    Pt reports when he is discharged he would like to get his car fixed. Pt states he is discharging home with his mother and is unsure where he will follow up for outpatient treatment. Pt was friendly and cooperative during evening group.       Ariana Pacheco RN

## 2023-10-09 NOTE — CONSULTS
[facility] ADULT PSYCH 4E  [address] [facility] [address]  Dept: [phone]  Loc: [phone]  Visit Date: 10/4/2023    Urology Consult Note    Reason for Consult:  \"lump of right side of penis\"  Requesting Physician:  primary    History Obtained From:  patient, electronic medical record    Chief Complaint: suicidal ideation    HISTORY OF PRESENT ILLNESS:                The patient is a 19 y.o. male with significant past medical history of see below who is admitted on [facility] for suicidal ideation  URO consulted for above  Has some firmness on right side of scrotum  He reports noticing it a couple days ago  Reports some pain and dysuria which is improved with tylenol/motrin    Past Medical History:    History reviewed. No pertinent past medical history. Past Surgical History:    History reviewed. No pertinent surgical history. Allergies:  Patient has no known allergies. Social History:  Social History     Socioeconomic History    Marital status: Single     Spouse name: Not on file    Number of children: 0    Years of education: GED    Highest education level: Not on file   Occupational History    Not on file   Tobacco Use    Smoking status: Former     Types: Cigarettes    Smokeless tobacco: Never   Vaping Use    Vaping Use: Never used   Substance and Sexual Activity    Alcohol use: Yes     Comment: \"not much\"    Drug use: Yes     Types: Marijuana Juan Manuel Mater)     Comment: Daily.     Sexual activity: Not Currently   Other Topics Concern    Not on file   Social History Narrative    Not on file     Social Determinants of Health     Financial Resource Strain: Low Risk  (6/30/2023)    Overall Financial Resource Strain (CARDIA)     Difficulty of Paying Living Expenses: Not hard at all   Food Insecurity: No Food Insecurity (6/30/2023)    Hunger Vital Sign     Worried About Running Out of Food in the Last Year: Never true     Ran Out of Food in the Last Year: Never true

## 2023-10-09 NOTE — DISCHARGE SUMMARY
Physician Discharge Summary     Patient ID:  Chloe Carey  [id number]  40 y.o.  2005    Admit date: 10/5/2023    Discharge date and time: 10/9/2023  10:27 AM     Admitting Physician: Nik Owens MD     Discharge Physician: Justyn Jenkins MD      Admission Diagnoses: Schizoaffective disorder, depressive type ([address]) [F25.1]    IDENTIFYING INFORMATION: ***    HISTORY OF PRESENT ILLNESS: ***    MENTAL STATUS EXAMINATION AT ADMISSION: See H and P. Discharge Diagnoses:   MDD (major depressive disorder), recurrent, severe, with psychosis ([address])     History reviewed. No pertinent past medical history. Admission Condition: poor    Discharged Condition: stable    Indication for Admission: threat to self    Significant Diagnostic Studies:   See Results Review tab in EHR    UA:  Recent Labs     10/09/23  0950   NITRU NEGATIVE   COLORU YELLOW   PHUR 6.0   LEUKOCYTESUR NEGATIVE   UROBILINOGEN 1.0   BILIRUBINUR NEGATIVE   BLOODU NEGATIVE   GLUCOSEU NEGATIVE   KETUA NEGATIVE        TREATMENT AND CLINICAL COURSE:   Patient was admitted on the unit. Routine lab was ordered. Physical examination was within normal limits. At admission, patient was started on ***; Hydroxyzine & Trazodone were added. Patient did not have side effect from medications. Patient was involved in group and milieu therapy. Although patient was suicidal upon admission, patient did not have suicidal thought during this hospital stay. I strongly encouraged patient's sobriety from illicit drugs and alcohol. I spent some time discussing the effect of illicit drugs & alcohol on patient's mood. We discussed about smoking cessation. Toward the end of the hospital stay, patient become more hopeful. Overall, hospital stay was uncomplicated, and patient was discharged in stable condition. Consults: urology    Treatments: Psychotropic medications, therapy with group, milieu, and 1:1 with nurses, social workers and Attending physician.       Discharge

## 2023-10-09 NOTE — PROGRESS NOTES
Discharge planning-Aris is scheduled for a diagnostic assessment with [facility] on 10/16/23 at 1:15 pm. Suleman Bo

## 2023-10-09 NOTE — PROGRESS NOTES
Patient reports that lump at the base of his penis on the right side is now \"inflamed\". This nurse examined patient with Jacey Abad RN present. No redness or swelling noted. Patient denies pain at present time, reports the lump as \"uncomfortable\". Per Dr. Ganesh Holman note, the hospitalist was consulted and would like patient to see urology instead.

## 2023-10-10 ENCOUNTER — TELEPHONE (OUTPATIENT)
Dept: UROLOGY | Age: 18
End: 2023-10-10

## 2023-10-10 NOTE — TELEPHONE ENCOUNTER
Cosult for right scrotal pain  MARITA only shows  IMPRESSION:  1. Normal bilateral testicular ultrasound. 2. Prominent right inguinal lymph nodes, likely reactive. 3. Normal Doppler study.     Needs OV with DORIAN in 1-2 wk for sx check  May need abx if no improvement

## 2023-10-11 ENCOUNTER — OFFICE VISIT (OUTPATIENT)
Dept: FAMILY MEDICINE CLINIC | Age: 18
End: 2023-10-11
Payer: MEDICAID

## 2023-10-11 ENCOUNTER — HOSPITAL ENCOUNTER (OUTPATIENT)
Age: 18
Discharge: HOME | End: 2023-10-11
Payer: MEDICAID

## 2023-10-11 VITALS
TEMPERATURE: 97.6 F | DIASTOLIC BLOOD PRESSURE: 68 MMHG | BODY MASS INDEX: 19.9 KG/M2 | RESPIRATION RATE: 16 BRPM | WEIGHT: 126.8 LBS | OXYGEN SATURATION: 97 % | HEIGHT: 67 IN | SYSTOLIC BLOOD PRESSURE: 128 MMHG | HEART RATE: 70 BPM

## 2023-10-11 DIAGNOSIS — N41.0 ACUTE PROSTATITIS: ICD-10-CM

## 2023-10-11 DIAGNOSIS — N41.0 ACUTE PROSTATITIS: Primary | ICD-10-CM

## 2023-10-11 DIAGNOSIS — F25.1 SCHIZOAFFECTIVE DISORDER, DEPRESSIVE TYPE (HCC): ICD-10-CM

## 2023-10-11 DIAGNOSIS — R59.0 PELVIC LYMPHADENOPATHY: ICD-10-CM

## 2023-10-11 PROCEDURE — 87661 TRICHOMONAS VAGINALIS AMPLIF: CPT

## 2023-10-11 PROCEDURE — 99214 OFFICE O/P EST MOD 30 MIN: CPT | Performed by: NURSE PRACTITIONER

## 2023-10-11 RX ORDER — DOXYCYCLINE HYCLATE 100 MG
100 TABLET ORAL 2 TIMES DAILY
Qty: 60 TABLET | Refills: 0 | Status: SHIPPED | OUTPATIENT
Start: 2023-10-11 | End: 2023-11-10

## 2023-10-12 ENCOUNTER — TELEPHONE (OUTPATIENT)
Dept: FAMILY MEDICINE CLINIC | Age: 18
End: 2023-10-12

## 2023-10-12 NOTE — TELEPHONE ENCOUNTER
----- Message from GUILLERMO De Jesus CNP sent at 10/12/2023  8:46 AM EDT -----  Let Kylee Villagomez know the chlamydia and gonorrhea lab tests were negative. I would complete the doxycycline as directed. The other lab test he did at the hospital is pending.

## 2023-10-14 LAB
SPEC CONTAINER SPEC: NORMAL
SPECIMEN SOURCE: NORMAL
T VAGINALIS RRNA SPEC QL NAA+PROBE: NEGATIVE

## 2023-10-16 ENCOUNTER — TELEPHONE (OUTPATIENT)
Dept: FAMILY MEDICINE CLINIC | Age: 18
End: 2023-10-16

## 2023-10-16 NOTE — TELEPHONE ENCOUNTER
----- Message from GUILLERMO Lo CNP sent at 10/16/2023  8:14 AM EDT -----  Let Francia Bath know the last urine STD test was negative.

## 2023-12-01 ENCOUNTER — TELEPHONE (OUTPATIENT)
Dept: FAMILY MEDICINE CLINIC | Age: 18
End: 2023-12-01

## 2023-12-01 NOTE — TELEPHONE ENCOUNTER
Mother messaged via her [employer] requesting a referral to [facility] who can help manage Aris's psychiatric meds. Referral placed via [employer].

## 2023-12-11 ENCOUNTER — OFFICE VISIT (OUTPATIENT)
Dept: FAMILY MEDICINE CLINIC | Age: 18
End: 2023-12-11
Payer: MEDICAID

## 2023-12-11 DIAGNOSIS — F90.0 ATTENTION DEFICIT HYPERACTIVITY DISORDER (ADHD), PREDOMINANTLY INATTENTIVE TYPE: ICD-10-CM

## 2023-12-11 DIAGNOSIS — F25.1 SCHIZOAFFECTIVE DISORDER, DEPRESSIVE TYPE (HCC): Primary | ICD-10-CM

## 2023-12-11 DIAGNOSIS — F40.01 PANIC DISORDER WITH AGORAPHOBIA: ICD-10-CM

## 2023-12-11 DIAGNOSIS — F43.10 PTSD (POST-TRAUMATIC STRESS DISORDER): ICD-10-CM

## 2023-12-11 PROCEDURE — 99213 OFFICE O/P EST LOW 20 MIN: CPT | Performed by: NURSE PRACTITIONER

## 2023-12-11 NOTE — PATIENT INSTRUCTIONS
[facility]  Address: [address] [facility], [address] [place] [address]  Hours: Open ? Closes 5? PM  Phone: [phone]

## 2023-12-12 ENCOUNTER — TELEPHONE (OUTPATIENT)
Dept: FAMILY MEDICINE CLINIC | Age: 18
End: 2023-12-12

## 2023-12-12 NOTE — TELEPHONE ENCOUNTER
Per Wess Goldberg   Appointment should be scheduled as a nurse visit  Future Appointments   Date Time Provider [address]   12/13/2023  2:20 PM SCHEDULE, NURSE [address] [place] [address]   3/11/2024  2:00 PM Carlos Nix       Patient has been informed and voiced understanding

## 2023-12-12 NOTE — TELEPHONE ENCOUNTER
----- Message from Marcia Hammond sent at 12/12/2023 12:26 PM EST -----  Subject: Message to Provider    QUESTIONS  Information for Provider? Returning the office call about scheduling the   swab appt  ---------------------------------------------------------------------------  --------------  [address]  [phone]; OK to leave message on voicemail  ---------------------------------------------------------------------------  --------------  SCRIPT ANSWERS  Relationship to Patient? Parent  Representative Name? mom  Patient is under 25 and the Parent has custody? No  Is the representative on the Communication Release of Information (RACHAEL)   form in Epic?  Yes

## 2023-12-13 ENCOUNTER — NURSE ONLY (OUTPATIENT)
Dept: FAMILY MEDICINE CLINIC | Age: 18
End: 2023-12-13

## 2023-12-13 DIAGNOSIS — F25.1 SCHIZOAFFECTIVE DISORDER, DEPRESSIVE TYPE (HCC): Primary | ICD-10-CM

## 2023-12-13 NOTE — PROGRESS NOTES
Pt presented to the office for gene site testing  Testing completed and ready to be picked up by [employer] [facility] tomorrow

## 2024-01-10 ENCOUNTER — OFFICE VISIT (OUTPATIENT)
Dept: FAMILY MEDICINE CLINIC | Age: 19
End: 2024-01-10
Payer: MEDICAID

## 2024-01-10 VITALS
TEMPERATURE: 98.1 F | BODY MASS INDEX: 21.18 KG/M2 | OXYGEN SATURATION: 97 % | HEART RATE: 73 BPM | RESPIRATION RATE: 10 BRPM | HEIGHT: 66 IN | DIASTOLIC BLOOD PRESSURE: 78 MMHG | SYSTOLIC BLOOD PRESSURE: 120 MMHG | WEIGHT: 131.8 LBS

## 2024-01-10 DIAGNOSIS — J10.1 INFLUENZA A: Primary | ICD-10-CM

## 2024-01-10 DIAGNOSIS — F25.1 SCHIZOAFFECTIVE DISORDER, DEPRESSIVE TYPE (HCC): ICD-10-CM

## 2024-01-10 LAB
INFLUENZA VIRUS A RNA: ABNORMAL
INFLUENZA VIRUS B RNA: ABNORMAL
Lab: 0
QC PASS/FAIL: NORMAL
SARS-COV-2 RDRP RESP QL NAA+PROBE: NEGATIVE

## 2024-01-10 PROCEDURE — 87502 INFLUENZA DNA AMP PROBE: CPT | Performed by: NURSE PRACTITIONER

## 2024-01-10 PROCEDURE — 99213 OFFICE O/P EST LOW 20 MIN: CPT | Performed by: NURSE PRACTITIONER

## 2024-01-10 PROCEDURE — 87635 SARS-COV-2 COVID-19 AMP PRB: CPT | Performed by: NURSE PRACTITIONER

## 2024-01-10 RX ORDER — OSELTAMIVIR PHOSPHATE 75 MG/1
75 CAPSULE ORAL 2 TIMES DAILY
Qty: 10 CAPSULE | Refills: 0 | Status: SHIPPED | OUTPATIENT
Start: 2024-01-10 | End: 2024-01-15

## 2024-01-10 RX ORDER — BENZONATATE 200 MG/1
200 CAPSULE ORAL 3 TIMES DAILY PRN
Qty: 30 CAPSULE | Refills: 0 | Status: SHIPPED | OUTPATIENT
Start: 2024-01-10 | End: 2024-01-17

## 2024-01-10 ASSESSMENT — PATIENT HEALTH QUESTIONNAIRE - PHQ9
6. FEELING BAD ABOUT YOURSELF - OR THAT YOU ARE A FAILURE OR HAVE LET YOURSELF OR YOUR FAMILY DOWN: 3
7. TROUBLE CONCENTRATING ON THINGS, SUCH AS READING THE NEWSPAPER OR WATCHING TELEVISION: 0
5. POOR APPETITE OR OVEREATING: 0
SUM OF ALL RESPONSES TO PHQ QUESTIONS 1-9: 6
3. TROUBLE FALLING OR STAYING ASLEEP: 3
1. LITTLE INTEREST OR PLEASURE IN DOING THINGS: 0
SUM OF ALL RESPONSES TO PHQ9 QUESTIONS 1 & 2: 0
8. MOVING OR SPEAKING SO SLOWLY THAT OTHER PEOPLE COULD HAVE NOTICED. OR THE OPPOSITE, BEING SO FIGETY OR RESTLESS THAT YOU HAVE BEEN MOVING AROUND A LOT MORE THAN USUAL: 0
SUM OF ALL RESPONSES TO PHQ QUESTIONS 1-9: 6
2. FEELING DOWN, DEPRESSED OR HOPELESS: 0
SUM OF ALL RESPONSES TO PHQ QUESTIONS 1-9: 6
SUM OF ALL RESPONSES TO PHQ QUESTIONS 1-9: 6
10. IF YOU CHECKED OFF ANY PROBLEMS, HOW DIFFICULT HAVE THESE PROBLEMS MADE IT FOR YOU TO DO YOUR WORK, TAKE CARE OF THINGS AT HOME, OR GET ALONG WITH OTHER PEOPLE: 1

## 2024-01-10 NOTE — PROGRESS NOTES
SUBJECTIVE:  Aris Edwards is a 18 y.o. y/o male that presents with Cough, Fever, Generalized Body Aches, and Nausea    HPI:      Symptoms have been present for 3 day(s).  Symptoms are unchanged since they initially started.    Fever? Yes  Runny nose or congestion?  Yes   Cough?  Yes  Sore throat?  Yes  Headache, fatigue, joint pains, muscle aches?  Yes  Shortness of breath/Wheezing?  Some SOB and chest tightness  Nausea/Vomiting/Diarrhea?  Yes - nausea  Double Sickening?  No  Sick contacts? Yes   Known COVID exposures of RFs?  No  Smoker?  No  Preexisting Respiratory conditions?  No    Patient has tried OTC without improvement.      Hasn't followed up with anyone for depression issues  Still feeling depressed  Mom reports that Aris has been sick twice in the last month plus holidays and hasn't gotten around to making follow up appts with either Toni Reddy or [facility] in [place]    No past medical history on file.    Social History     Socioeconomic History    Marital status: Single     Spouse name: Not on file    Number of children: 0    Years of education: GED    Highest education level: Not on file   Occupational History    Not on file   Tobacco Use    Smoking status: Former     Types: Cigarettes    Smokeless tobacco: Never   Vaping Use    Vaping Use: Never used   Substance and Sexual Activity    Alcohol use: Yes     Comment: \"not much\"    Drug use: Yes     Types: Marijuana (Weed)     Comment: Daily.    Sexual activity: Not Currently   Other Topics Concern    Not on file   Social History Narrative    Not on file     Social Determinants of Health     Financial Resource Strain: Low Risk  (6/30/2023)    Overall Financial Resource Strain (CARDIA)     Difficulty of Paying Living Expenses: Not hard at all   Food Insecurity: Not on file (6/30/2023)   Transportation Needs: Unknown (6/30/2023)    PRAPARE - Transportation     Lack of Transportation (Medical): Not on file     Lack of Transportation

## 2025-02-10 ENCOUNTER — TELEPHONE (OUTPATIENT)
Dept: FAMILY MEDICINE CLINIC | Age: 20
End: 2025-02-10

## 2025-02-10 ENCOUNTER — OFFICE VISIT (OUTPATIENT)
Dept: FAMILY MEDICINE CLINIC | Age: 20
End: 2025-02-10
Payer: MEDICAID

## 2025-02-10 VITALS
BODY MASS INDEX: 22.47 KG/M2 | HEART RATE: 105 BPM | DIASTOLIC BLOOD PRESSURE: 82 MMHG | RESPIRATION RATE: 12 BRPM | TEMPERATURE: 96.8 F | SYSTOLIC BLOOD PRESSURE: 120 MMHG | WEIGHT: 139.8 LBS | HEIGHT: 66 IN | OXYGEN SATURATION: 96 %

## 2025-02-10 DIAGNOSIS — J02.9 ACUTE PHARYNGITIS, UNSPECIFIED ETIOLOGY: ICD-10-CM

## 2025-02-10 DIAGNOSIS — J06.9 ACUTE UPPER RESPIRATORY INFECTION: ICD-10-CM

## 2025-02-10 DIAGNOSIS — F90.0 ATTENTION DEFICIT HYPERACTIVITY DISORDER (ADHD), PREDOMINANTLY INATTENTIVE TYPE: Primary | ICD-10-CM

## 2025-02-10 LAB — STREPTOCOCCUS A RNA: NEGATIVE

## 2025-02-10 PROCEDURE — 99214 OFFICE O/P EST MOD 30 MIN: CPT | Performed by: NURSE PRACTITIONER

## 2025-02-10 PROCEDURE — 87651 STREP A DNA AMP PROBE: CPT | Performed by: NURSE PRACTITIONER

## 2025-02-10 RX ORDER — AZITHROMYCIN 250 MG/1
TABLET, FILM COATED ORAL
Qty: 6 TABLET | Refills: 0 | Status: SHIPPED | OUTPATIENT
Start: 2025-02-10 | End: 2025-02-20

## 2025-02-10 RX ORDER — HYDROXYZINE PAMOATE 25 MG/1
CAPSULE ORAL
COMMUNITY
Start: 2024-11-15

## 2025-02-10 RX ORDER — ATOMOXETINE 60 MG/1
60 CAPSULE ORAL DAILY
Qty: 30 CAPSULE | Refills: 3 | Status: SHIPPED | OUTPATIENT
Start: 2025-02-10

## 2025-02-10 RX ORDER — MIRTAZAPINE 15 MG/1
15 TABLET, FILM COATED ORAL NIGHTLY
COMMUNITY
Start: 2024-11-15

## 2025-02-10 SDOH — ECONOMIC STABILITY: FOOD INSECURITY: WITHIN THE PAST 12 MONTHS, YOU WORRIED THAT YOUR FOOD WOULD RUN OUT BEFORE YOU GOT MONEY TO BUY MORE.: NEVER TRUE

## 2025-02-10 SDOH — ECONOMIC STABILITY: FOOD INSECURITY: WITHIN THE PAST 12 MONTHS, THE FOOD YOU BOUGHT JUST DIDN'T LAST AND YOU DIDN'T HAVE MONEY TO GET MORE.: NEVER TRUE

## 2025-02-10 ASSESSMENT — PATIENT HEALTH QUESTIONNAIRE - PHQ9
SUM OF ALL RESPONSES TO PHQ QUESTIONS 1-9: 4
6. FEELING BAD ABOUT YOURSELF - OR THAT YOU ARE A FAILURE OR HAVE LET YOURSELF OR YOUR FAMILY DOWN: NOT AT ALL
SUM OF ALL RESPONSES TO PHQ QUESTIONS 1-9: 4
10. IF YOU CHECKED OFF ANY PROBLEMS, HOW DIFFICULT HAVE THESE PROBLEMS MADE IT FOR YOU TO DO YOUR WORK, TAKE CARE OF THINGS AT HOME, OR GET ALONG WITH OTHER PEOPLE: NOT DIFFICULT AT ALL
5. POOR APPETITE OR OVEREATING: NOT AT ALL
9. THOUGHTS THAT YOU WOULD BE BETTER OFF DEAD, OR OF HURTING YOURSELF: NOT AT ALL
4. FEELING TIRED OR HAVING LITTLE ENERGY: NOT AT ALL
7. TROUBLE CONCENTRATING ON THINGS, SUCH AS READING THE NEWSPAPER OR WATCHING TELEVISION: NOT AT ALL
8. MOVING OR SPEAKING SO SLOWLY THAT OTHER PEOPLE COULD HAVE NOTICED. OR THE OPPOSITE, BEING SO FIGETY OR RESTLESS THAT YOU HAVE BEEN MOVING AROUND A LOT MORE THAN USUAL: NOT AT ALL
1. LITTLE INTEREST OR PLEASURE IN DOING THINGS: NOT AT ALL
3. TROUBLE FALLING OR STAYING ASLEEP: NEARLY EVERY DAY
2. FEELING DOWN, DEPRESSED OR HOPELESS: SEVERAL DAYS
SUM OF ALL RESPONSES TO PHQ9 QUESTIONS 1 & 2: 1

## 2025-02-10 NOTE — TELEPHONE ENCOUNTER
Pt called in stating that he has been sick for past 4 days with fever, sore throat, sinus pressure, body aches, headaches       Pt would like to be seen today.     Please advise

## 2025-02-10 NOTE — TELEPHONE ENCOUNTER
Future Appointments   Date Time Provider Department Center   2/10/2025 12:40 PM Raul Nix, APRN - CNP [facility]

## 2025-02-10 NOTE — PROGRESS NOTES
educational materials - see patient instructions.    All patient questions answered.  Patient voiced understanding.       I have reviewed this patient's history, habits, and medication list and have updated the chart where appropriate.    
Joint pain, Back pain, Gait problems, Joint swelling, Myalgias  Neurological:  Dizziness, Headaches, Presyncope, Numbness, Seizures, Tremors  Allergy:  Environmental allergies, Food allergies  Endocrine:  Heat Intolerance, Cold Intolerance, Polydipsia, Polyphagia, Polyuria    PHYSICAL EXAM:  Vitals:    02/10/25 1300   BP: 120/82   Pulse: (!) 105   Resp: 12   Temp: 96.8 °F (36 °C)   TempSrc: Temporal   SpO2: 96%   Weight: 63.4 kg (139 lb 12.8 oz)   Height: 1.676 m (5' 6\")       Body mass index is 22.56 kg/m².       VS Reviewed  General Appearance: A&O x 3, No acute distress,well developed and well- nourished  Head: normocephalic and atraumatic  Eyes: pupils equal, round, and reactive to light, extraocular eye movements intact, conjunctivae and eye lids without erythema  Neck: supple and non-tender without mass, no thyromegaly or thyroid nodules, + bilateral tender cervical lymphadenopathy  Pulmonary/Chest: clear to auscultation bilaterally- no wheezes, rales or rhonchi, normal air movement, no respiratory distress or retractions  Cardiovascular: S1 and S2 auscultated w/ RRR. No murmurs, rubs, clicks, or gallops, distal pulses intact.  Abdomen: soft, non-tender, non-distended, bowl sounds physiologic,  no rebound or guarding, no masses or hernias noted. Liver and spleen without enlargement.   Extremities: no cyanosis, clubbing or edema of the lower extremities.   Musculoskeletal: No joint swelling or gross deformity   Neuro:  Alert, 5/5 strength globally and symmetrically  Psych: Affect appropriate.  Mood normal. Thought process is normal without evidence of depression or psychosis. Good insight and appropriate interaction.  Cognition and memory appear to be intact.  Skin: warm and dry, no rash or erythema  Lymph:  No cervical, auricular or supraclavicular lymph nodes palpated      ASSESSMENT & PLAN  Aris was seen today for cold symptoms.    Diagnoses and all orders for this visit:    Attention deficit hyperactivity

## 2025-04-10 ENCOUNTER — OFFICE VISIT (OUTPATIENT)
Dept: FAMILY MEDICINE CLINIC | Age: 20
End: 2025-04-10
Payer: MEDICAID

## 2025-04-10 VITALS
TEMPERATURE: 97 F | DIASTOLIC BLOOD PRESSURE: 62 MMHG | RESPIRATION RATE: 12 BRPM | HEIGHT: 66 IN | WEIGHT: 139.6 LBS | BODY MASS INDEX: 22.43 KG/M2 | SYSTOLIC BLOOD PRESSURE: 112 MMHG | HEART RATE: 76 BPM | OXYGEN SATURATION: 96 %

## 2025-04-10 DIAGNOSIS — F40.01 PANIC DISORDER WITH AGORAPHOBIA: ICD-10-CM

## 2025-04-10 DIAGNOSIS — F90.0 ATTENTION DEFICIT HYPERACTIVITY DISORDER (ADHD), PREDOMINANTLY INATTENTIVE TYPE: Primary | ICD-10-CM

## 2025-04-10 DIAGNOSIS — F43.10 PTSD (POST-TRAUMATIC STRESS DISORDER): ICD-10-CM

## 2025-04-10 PROCEDURE — 99214 OFFICE O/P EST MOD 30 MIN: CPT | Performed by: NURSE PRACTITIONER

## 2025-04-10 RX ORDER — CLONIDINE HYDROCHLORIDE 0.1 MG/1
0.1 TABLET ORAL DAILY PRN
Qty: 30 TABLET | Refills: 1 | Status: SHIPPED | OUTPATIENT
Start: 2025-04-10

## 2025-04-10 RX ORDER — ATOMOXETINE 80 MG/1
80 CAPSULE ORAL DAILY
Qty: 30 CAPSULE | Refills: 2 | Status: SHIPPED | OUTPATIENT
Start: 2025-04-10

## 2025-04-10 NOTE — PROGRESS NOTES
Strattera to 80 mg  - refill Clonidine for anxiety    DISPOSITION    Return in about 2 months (around 6/10/2025) for ADHD.    Aris released without restrictions.      PATIENT COUNSELING    Counseling was provided today regarding the following topics: Healthy eating habits, Regular exercise, substance abuse and healthy sleep habits.    Aris received counseling on the following healthy behaviors: medication adherence    Patient given educational materials on: See Attached    I have instructed Aris to complete a self tracking handout on none and instructed them to bring it with them to his next appointment.     Barriers to learning and self management: none    Discussed use, benefit, and side effects of prescribed medications.  Barriers to medication compliance addressed.  All patient questions answered.  Pt voiced understanding.       Electronically signed by GUILLERMO Veloz CNP on 4/10/2025 at 11:54 AM

## 2025-06-03 DIAGNOSIS — F43.10 PTSD (POST-TRAUMATIC STRESS DISORDER): ICD-10-CM

## 2025-06-03 DIAGNOSIS — F90.0 ATTENTION DEFICIT HYPERACTIVITY DISORDER (ADHD), PREDOMINANTLY INATTENTIVE TYPE: ICD-10-CM

## 2025-06-03 DIAGNOSIS — F40.01 PANIC DISORDER WITH AGORAPHOBIA: ICD-10-CM

## 2025-06-03 RX ORDER — CLONIDINE HYDROCHLORIDE 0.1 MG/1
TABLET ORAL
Qty: 30 TABLET | Refills: 0 | Status: SHIPPED | OUTPATIENT
Start: 2025-06-03

## 2025-06-03 NOTE — TELEPHONE ENCOUNTER
Recent Visits  Date Type Provider Dept   04/10/25 Office Visit Raul Nix APRN - CNP [facility]   02/10/25 Office Visit Raul Nix APRN - CNP [facility]   01/10/24 Office Visit Raul Nix APRN - CNP [facility]   12/11/23 Office Visit Raul Nix APRN - CNP [facility]   Showing recent visits within past 540 days with a meds authorizing provider and meeting all other requirements  Future Appointments  Date Type Provider Dept   06/12/25 Appointment Raul Nix APRN - CNP [facility]   Showing future appointments within next 150 days with a meds authorizing provider and meeting all other requirements

## 2025-06-12 ENCOUNTER — OFFICE VISIT (OUTPATIENT)
Dept: FAMILY MEDICINE CLINIC | Age: 20
End: 2025-06-12
Payer: MEDICAID

## 2025-06-12 VITALS
BODY MASS INDEX: 21.73 KG/M2 | WEIGHT: 135.2 LBS | SYSTOLIC BLOOD PRESSURE: 120 MMHG | HEIGHT: 66 IN | TEMPERATURE: 97.8 F | RESPIRATION RATE: 12 BRPM | HEART RATE: 88 BPM | DIASTOLIC BLOOD PRESSURE: 80 MMHG | OXYGEN SATURATION: 99 %

## 2025-06-12 DIAGNOSIS — F25.1 SCHIZOAFFECTIVE DISORDER, DEPRESSIVE TYPE (HCC): Primary | ICD-10-CM

## 2025-06-12 DIAGNOSIS — F90.0 ATTENTION DEFICIT HYPERACTIVITY DISORDER (ADHD), PREDOMINANTLY INATTENTIVE TYPE: ICD-10-CM

## 2025-06-12 PROCEDURE — 99213 OFFICE O/P EST LOW 20 MIN: CPT | Performed by: NURSE PRACTITIONER

## 2025-06-12 RX ORDER — ATOMOXETINE 80 MG/1
80 CAPSULE ORAL DAILY
Qty: 90 CAPSULE | Refills: 3 | Status: SHIPPED | OUTPATIENT
Start: 2025-06-12

## 2025-06-12 NOTE — PROGRESS NOTES
complete a self tracking handout on none and instructed them to bring it with them to his next appointment.     Barriers to learning and self management: none    Discussed use, benefit, and side effects of prescribed medications.  Barriers to medication compliance addressed.  All patient questions answered.  Pt voiced understanding.       Electronically signed by GUILLERMO Veloz CNP on 6/12/2025 at 11:37 AM

## 2025-06-13 ENCOUNTER — HOSPITAL ENCOUNTER (OUTPATIENT)
Age: 20
Discharge: HOME | End: 2025-06-13

## 2025-08-04 DIAGNOSIS — F90.0 ATTENTION DEFICIT HYPERACTIVITY DISORDER (ADHD), PREDOMINANTLY INATTENTIVE TYPE: ICD-10-CM

## 2025-08-04 DIAGNOSIS — F43.10 PTSD (POST-TRAUMATIC STRESS DISORDER): ICD-10-CM

## 2025-08-04 DIAGNOSIS — F40.01 PANIC DISORDER WITH AGORAPHOBIA: ICD-10-CM

## 2025-08-04 RX ORDER — CLONIDINE HYDROCHLORIDE 0.1 MG/1
TABLET ORAL
Qty: 30 TABLET | Refills: 1 | Status: SHIPPED | OUTPATIENT
Start: 2025-08-04 | End: 2025-08-05 | Stop reason: SDUPTHER

## 2025-08-05 ENCOUNTER — PATIENT MESSAGE (OUTPATIENT)
Dept: FAMILY MEDICINE CLINIC | Age: 20
End: 2025-08-05

## 2025-08-05 DIAGNOSIS — F43.10 PTSD (POST-TRAUMATIC STRESS DISORDER): ICD-10-CM

## 2025-08-05 DIAGNOSIS — F40.01 PANIC DISORDER WITH AGORAPHOBIA: ICD-10-CM

## 2025-08-05 DIAGNOSIS — F90.0 ATTENTION DEFICIT HYPERACTIVITY DISORDER (ADHD), PREDOMINANTLY INATTENTIVE TYPE: ICD-10-CM

## 2025-08-05 RX ORDER — CLONIDINE HYDROCHLORIDE 0.1 MG/1
0.1 TABLET ORAL 3 TIMES DAILY PRN
Qty: 90 TABLET | Refills: 5 | Status: SHIPPED | OUTPATIENT
Start: 2025-08-05